# Patient Record
Sex: MALE | Race: WHITE
[De-identification: names, ages, dates, MRNs, and addresses within clinical notes are randomized per-mention and may not be internally consistent; named-entity substitution may affect disease eponyms.]

---

## 2019-11-21 ENCOUNTER — HOSPITAL ENCOUNTER (OUTPATIENT)
Dept: HOSPITAL 59 - SUR | Age: 54
Discharge: HOME | End: 2019-11-21
Attending: ORTHOPAEDIC SURGERY
Payer: COMMERCIAL

## 2019-11-21 DIAGNOSIS — G56.02: Primary | ICD-10-CM

## 2019-11-21 NOTE — OPERATIVE NOTE
DATE OF SURGERY:  11/21/2019



SURGEON:  Ricardo Zavala D.O.



REFERRING PHYSICIAN:  Anthony Reeves D.O.



PREOPERATIVE DIAGNOSIS:

CARPAL TUNNEL SYNDROME IN THE LEFT WRIST.  



POSTOPERATIVE DIAGNOSIS:

CARPAL TUNNEL SYNDROME IN THE LEFT WRIST.  



OPERATION:

DECOMPRESSION OF THE LEFT MEDIAN NERVE OF THE WRIST USING 3.5 LOOP 
MAGNIFICATION.  



DESCRIPTION:  This is 54-year-old male was taken to the Operating Room and 
placed in the supine position on the operating room table.  General anesthesia 
was induced, the left upper extremity was elevated, it was prepped with 
Hibiclens and draped in the usual sterile fashion.  It was exsanguinated and the
tourniquet was inflated to 250 mmHg.  



A palmar incision was utilized following the hypothenar crease from the level of
the base of the web space of the thumb to the flexor crease of the wrist.  
Dissection was carried down through the skin and subcutaneous tissues.  
Hemostasis was obtained with electrocautery.  The palmar fascia was divided in 
line with the skin incision.  The flexor retinaculum was identified, it was 
punctured, split to its proximal margin and then when the contents of the carpal
tunnel are under direct vision the transverse carpal ligament was transected 
along its ulnar border and the radial flap was raised to expose the entire 
median nerve under the transverse carpal ligament.  The recurrent motor branch 
of the median nerve was in an abnormal position being on the radial aspect of 
the nerve, but it was found to be intact.  The median nerve itself appeared to 
be grossly normal other than the unusual position of the recurrent motor branch.
 The wound was irrigated with lactated Ringer's solution and the tourniquet 
released.  Hemostasis was obtained with the electrocautery and the wound closed 
with interrupted 6-0 nylon suture.  Sterile dressings were applied and the 
patient was subsequently taken to the Recovery Room in satisfactory condition.  



GROSS PATHOLOGY:  This patient demonstrated normal appearance of the median 
nerve.  There was an unusual position of the recurrent motor branch on the 
radial aspect of the median nerve.  



JOB NUMBER:  [id number]

MTDD

## 2019-12-26 ENCOUNTER — HOSPITAL ENCOUNTER (OUTPATIENT)
Dept: HOSPITAL 59 - SUR | Age: 54
Discharge: HOME | End: 2019-12-26
Attending: ORTHOPAEDIC SURGERY
Payer: COMMERCIAL

## 2019-12-26 DIAGNOSIS — Z85.820: ICD-10-CM

## 2019-12-26 DIAGNOSIS — G56.01: Primary | ICD-10-CM

## 2019-12-26 PROCEDURE — 64727 INTERNAL NEUROLYSIS: CPT

## 2019-12-26 PROCEDURE — 64721 CARPAL TUNNEL SURGERY: CPT

## 2019-12-26 PROCEDURE — 01810 ANES PX NRV MUSC F/ARM WRST: CPT

## 2019-12-31 NOTE — OPERATIVE NOTE
DATE OF SURGERY:   12/26/2019



PREOPERATIVE DIAGNOSIS:   Carpal tunnel syndrome of the right wrist.  



POSTOPERATIVE DIAGNOSIS:  Carpal tunnel syndrome of the right wrist.  



OPERATION:   Decompression right median nerve of the wrist using 3.5 loupe 
magnification. 



SURGEON:   Ricardo Zavala D.O.



REFERRING PHYSICIAN:  Anthony Choi.



PROCEDURE:  This 54-year-old male was taken to the operating room and placed in 
the supine position on the operating room table.  General anesthetic was 
administered and the right upper extremity was elevated, prepped with Hibiclens 
and draped in the usual sterile fashion.  



The right upper extremity was exsanguinated and the tourniquet inflated to 250 
mmHg.



A palmar incision was utilized following the hypothenar crease from the level of
the base of the web space of the thumb to the flexor crease of the wrist.  
Dissection was carried down through the skin and subcutaneous tissue. The palmar
fascia was divided in line with the skin incision to expose the flexor 
retinaculum and the transverse carpal ligament.  The flexor retinaculum was 
punctured and split to its proximal margin and with the contents of the carpal 
tunnel under direct vision, the transverse carpal ligament was transected along 
its ulnar border.   The radial flap was raised to expose the entire median nerve
under the transverse carpal ligament and the median nerve itself appeared to be 
normal.  There was no abnormality of the recurrent motor branch of the median 
nerve.   



The wound was irrigated with lactated Ringer's solution, the tourniquet was 
released.  Hemostasis obtained with the electrocautery.  

The wound was closed with interrupted 6-0 nylon suture to the skin.   Sterile 
dressings were applied with a plaster splint immobilization with the wrist in 
slight dorsiflexion and the thumb in an adducted position.  

[facility]